# Patient Record
Sex: FEMALE | Race: WHITE | NOT HISPANIC OR LATINO | URBAN - METROPOLITAN AREA
[De-identification: names, ages, dates, MRNs, and addresses within clinical notes are randomized per-mention and may not be internally consistent; named-entity substitution may affect disease eponyms.]

---

## 2019-04-29 ENCOUNTER — INPATIENT (INPATIENT)
Facility: HOSPITAL | Age: 54
LOS: 2 days | Discharge: HOME | DRG: 101 | End: 2019-05-02
Attending: HOSPITALIST | Admitting: HOSPITALIST
Payer: MEDICARE

## 2019-04-29 VITALS
RESPIRATION RATE: 18 BRPM | HEART RATE: 79 BPM | SYSTOLIC BLOOD PRESSURE: 128 MMHG | DIASTOLIC BLOOD PRESSURE: 72 MMHG | WEIGHT: 177.25 LBS | TEMPERATURE: 98 F | OXYGEN SATURATION: 95 %

## 2019-04-29 DIAGNOSIS — R56.9 UNSPECIFIED CONVULSIONS: ICD-10-CM

## 2019-04-29 DIAGNOSIS — Q90.9 DOWN SYNDROME, UNSPECIFIED: ICD-10-CM

## 2019-04-29 LAB
AMPHET UR-MCNC: NEGATIVE — SIGNIFICANT CHANGE UP
ANION GAP SERPL CALC-SCNC: 14 MMOL/L — SIGNIFICANT CHANGE UP (ref 5–17)
BARBITURATES UR SCN-MCNC: NEGATIVE — SIGNIFICANT CHANGE UP
BENZODIAZ UR-MCNC: NEGATIVE — SIGNIFICANT CHANGE UP
BUN SERPL-MCNC: 15 MG/DL — SIGNIFICANT CHANGE UP (ref 8–20)
CALCIUM SERPL-MCNC: 9.2 MG/DL — SIGNIFICANT CHANGE UP (ref 8.6–10.2)
CHLORIDE SERPL-SCNC: 104 MMOL/L — SIGNIFICANT CHANGE UP (ref 98–107)
CO2 SERPL-SCNC: 24 MMOL/L — SIGNIFICANT CHANGE UP (ref 22–29)
COCAINE METAB.OTHER UR-MCNC: NEGATIVE — SIGNIFICANT CHANGE UP
CREAT SERPL-MCNC: 0.95 MG/DL — SIGNIFICANT CHANGE UP (ref 0.5–1.3)
GLUCOSE SERPL-MCNC: 102 MG/DL — SIGNIFICANT CHANGE UP (ref 70–115)
HCT VFR BLD CALC: 41.8 % — SIGNIFICANT CHANGE UP (ref 37–47)
HGB BLD-MCNC: 14.4 G/DL — SIGNIFICANT CHANGE UP (ref 12–16)
MCHC RBC-ENTMCNC: 33.9 PG — HIGH (ref 27–31)
MCHC RBC-ENTMCNC: 34.4 G/DL — SIGNIFICANT CHANGE UP (ref 32–36)
MCV RBC AUTO: 98.4 FL — SIGNIFICANT CHANGE UP (ref 81–99)
METHADONE UR-MCNC: NEGATIVE — SIGNIFICANT CHANGE UP
OPIATES UR-MCNC: NEGATIVE — SIGNIFICANT CHANGE UP
PCP SPEC-MCNC: SIGNIFICANT CHANGE UP
PCP UR-MCNC: NEGATIVE — SIGNIFICANT CHANGE UP
PLATELET # BLD AUTO: 264 K/UL — SIGNIFICANT CHANGE UP (ref 150–400)
POTASSIUM SERPL-MCNC: 3.8 MMOL/L — SIGNIFICANT CHANGE UP (ref 3.5–5.3)
POTASSIUM SERPL-SCNC: 3.8 MMOL/L — SIGNIFICANT CHANGE UP (ref 3.5–5.3)
PROLACTIN SERPL-MCNC: 10.4 NG/ML — SIGNIFICANT CHANGE UP (ref 3.4–24.1)
RBC # BLD: 4.25 M/UL — LOW (ref 4.4–5.2)
RBC # FLD: 14.6 % — SIGNIFICANT CHANGE UP (ref 11–15.6)
SODIUM SERPL-SCNC: 142 MMOL/L — SIGNIFICANT CHANGE UP (ref 135–145)
THC UR QL: NEGATIVE — SIGNIFICANT CHANGE UP
WBC # BLD: 7.2 K/UL — SIGNIFICANT CHANGE UP (ref 4.8–10.8)
WBC # FLD AUTO: 7.2 K/UL — SIGNIFICANT CHANGE UP (ref 4.8–10.8)

## 2019-04-29 PROCEDURE — 99223 1ST HOSP IP/OBS HIGH 75: CPT | Mod: AI

## 2019-04-29 RX ORDER — ALBUTEROL 90 UG/1
1 AEROSOL, METERED ORAL
Qty: 0 | Refills: 0 | COMMUNITY

## 2019-04-29 RX ORDER — ALBUTEROL 90 UG/1
4 AEROSOL, METERED ORAL EVERY 8 HOURS
Qty: 0 | Refills: 0 | Status: DISCONTINUED | OUTPATIENT
Start: 2019-04-29 | End: 2019-05-02

## 2019-04-29 RX ORDER — LEVOTHYROXINE SODIUM 125 MCG
125 TABLET ORAL DAILY
Qty: 0 | Refills: 0 | Status: DISCONTINUED | OUTPATIENT
Start: 2019-04-29 | End: 2019-05-02

## 2019-04-29 RX ORDER — LEVOTHYROXINE SODIUM 125 MCG
1 TABLET ORAL
Qty: 0 | Refills: 0 | COMMUNITY

## 2019-04-29 RX ORDER — ENOXAPARIN SODIUM 100 MG/ML
40 INJECTION SUBCUTANEOUS EVERY 24 HOURS
Qty: 0 | Refills: 0 | Status: DISCONTINUED | OUTPATIENT
Start: 2019-04-29 | End: 2019-05-02

## 2019-04-29 RX ORDER — LORATADINE 10 MG/1
10 TABLET ORAL DAILY
Qty: 0 | Refills: 0 | Status: DISCONTINUED | OUTPATIENT
Start: 2019-04-29 | End: 2019-05-02

## 2019-04-29 RX ORDER — EPINASTINE HYDROCHLORIDE 0.5 MG/ML
1 SOLUTION OPHTHALMIC
Qty: 0 | Refills: 0 | COMMUNITY

## 2019-04-29 RX ORDER — LEVETIRACETAM 250 MG/1
1250 TABLET, FILM COATED ORAL
Qty: 0 | Refills: 0 | Status: DISCONTINUED | OUTPATIENT
Start: 2019-04-29 | End: 2019-05-02

## 2019-04-29 RX ORDER — OMEPRAZOLE 10 MG/1
1 CAPSULE, DELAYED RELEASE ORAL
Qty: 0 | Refills: 0 | COMMUNITY

## 2019-04-29 RX ORDER — CETIRIZINE HYDROCHLORIDE 10 MG/1
1 TABLET ORAL
Qty: 0 | Refills: 0 | COMMUNITY

## 2019-04-29 RX ORDER — LEVETIRACETAM 250 MG/1
2.5 TABLET, FILM COATED ORAL
Qty: 0 | Refills: 0 | COMMUNITY

## 2019-04-29 RX ORDER — PANTOPRAZOLE SODIUM 20 MG/1
40 TABLET, DELAYED RELEASE ORAL
Qty: 0 | Refills: 0 | Status: DISCONTINUED | OUTPATIENT
Start: 2019-04-29 | End: 2019-05-02

## 2019-04-29 RX ADMIN — ALBUTEROL 4 MILLIGRAM(S): 90 AEROSOL, METERED ORAL at 12:59

## 2019-04-29 RX ADMIN — ALBUTEROL 4 MILLIGRAM(S): 90 AEROSOL, METERED ORAL at 21:51

## 2019-04-29 RX ADMIN — LEVETIRACETAM 1250 MILLIGRAM(S): 250 TABLET, FILM COATED ORAL at 17:02

## 2019-04-29 RX ADMIN — LORATADINE 10 MILLIGRAM(S): 10 TABLET ORAL at 12:59

## 2019-04-29 RX ADMIN — ENOXAPARIN SODIUM 40 MILLIGRAM(S): 100 INJECTION SUBCUTANEOUS at 16:58

## 2019-04-29 NOTE — H&P ADULT - NSHPPHYSICALEXAM_GEN_ALL_CORE
PHYSICAL EXAM:    GENERAL: NAD, well-groomed, Downs facial features appreciated  HEAD:  Atraumatic, Normocephalic  EYES: strabismus, CN palsy, PERRLA, conjunctiva and sclera clear  ENMT:; Moist mucous membranes, poor dentition, No lesions  NECK: Supple  NERVOUS SYSTEM:  Alert & Oriented X1-2, Good concentration  CHEST/LUNG: Clear to percussion bilaterally; No rales, rhonchi  HEART: Regular rate and rhythm; No murmurs  ABDOMEN: Soft, Nontender, Nondistended; Bowel sounds present  EXTREMITIES:   No clubbing, cyanosis, or edema  SKIN: No rashes or lesions

## 2019-04-29 NOTE — H&P ADULT - HISTORY OF PRESENT ILLNESS
53yr old female with Downs syndrome presents with new onset seizures since jan 2019. She was admitted at [facility] for the same and has been on Keppra. Per sister, since she has been on Keppra 1250mg bid her seizures have been well controlled. She is now admitted for Video EEG monitoring. Patient does not offer any complaints.

## 2019-04-29 NOTE — PROCEDURE NOTE - NSPOSTCAREGUIDE_GEN_A_CORE
Care for catheter as per unit/ICU protocols/Verbal/written post procedure instructions were given to patient/caregiver/Instructed patient/caregiver regarding signs and symptoms of infection

## 2019-04-29 NOTE — CONSULT NOTE ADULT - SUBJECTIVE AND OBJECTIVE BOX
HPI: Pt is  poor historian and therefore information obtained from the chart.  53yFemale RH with Downs syndrome presents with new onset seizures since jan 2019. She was admitted at [facility] for the same and has been on Keppra. Per sister, since she has been on Keppra 1250mg bid her seizures have been well controlled. Pt has been seen by Dr Porras at [facility] and sent here for VEEG to assess for seizure related abnormalities.  No reported head injuries/infections and offers no complaints.      PAST MEDICAL & SURGICAL HISTORY:  Seizures  Hypothyroidism  Asthma  Down's syndrome  No significant past surgical history    MEDICATIONS  (STANDING):  ALBUTerol  Tablet 4 milliGRAM(s) Oral every 8 hours  enoxaparin Injectable 40 milliGRAM(s) SubCutaneous every 24 hours  levothyroxine 125 MICROGram(s) Oral daily  loratadine 10 milliGRAM(s) Oral daily    MEDICATIONS  (PRN):  pantoprazole    Tablet 40 milliGRAM(s) Oral before breakfast PRN heart burn    Allergies    penicillins (Unknown)    Intolerances        FAMILY HISTORY:  Family history of cancer in mother          SOCIAL HISTORY:  Denies toxic habits;     REVIEW OF SYSTEMS:    As noted in the HPI.    VITAL SIGNS:  Vital Signs Last 24 Hrs  T(C): 36.4 (29 Apr 2019 07:40), Max: 36.4 (29 Apr 2019 07:40)  T(F): 97.6 (29 Apr 2019 07:40), Max: 97.6 (29 Apr 2019 07:40)  HR: 79 (29 Apr 2019 07:40) (79 - 79)  BP: 128/72 (29 Apr 2019 07:40) (128/72 - 128/72)  BP(mean): --  RR: 18 (29 Apr 2019 07:40) (18 - 18)  SpO2: 95% (29 Apr 2019 07:40) (95% - 95%)    PHYSICAL EXAMINATION:  General: Well-developed, well nourished, in no acute distress.  Eyes: Conjunctiva and sclera clear. Fundoscopic examination was deferred. Downs facial findings with dysconjugate eyes.  Neck: Supple.  Cardiac: +S1 & S2; Regular.  Chest: CTA b/l.      Neurologic:  - Mental Status:  Alert, awake, oriented to person Speech is dysarthric follows somewhat.  Cranial Nerves II-XII:  2-12 intact except for dysconjugate eyes.  - Motor:  Strength is symmetric/spontaneous throughout.  There is no pronator drift.  Normal muscle bulk and tone throughout.  - Reflexes:  2+ and symmetric throughout.  Plantars downwards b/l.  - Sensory:  Intact and symmetric to light touch, and joint-position sense.  - Coordination:  Pt unable.  - Gait: Deferred.      LABS:        Pending

## 2019-04-29 NOTE — CONSULT NOTE ADULT - ASSESSMENT
Impression:  Static encephalopathy chronic with Downs syndrome and now with new onset seizures.    Plan:    VEEG x 72 hrs.  Keppra 1250mg twice a day on board.  Maintain seizure precautions.  DVT prophylaxis recommended.  Await for blood serology.  Will follow.

## 2019-04-29 NOTE — H&P ADULT - ASSESSMENT
53yr old with Downs Syndrome admitted with new onset seizures for video EEG monitoring.    # Seizures - Video EEG per Neurology  Neurology consult  AEDs per Neuro recs  Seizure precautions    # Downs Syndrome - Has h/o wandering per sister with elopment risk  Would start Enhanced supervision - RN instructed the need to upgrade level of observation if she is not able to be managed with enhanced supervision  Ct supportive care    # Hypothyroidism - ct levothyroxine    # asthma - ct albuterol, zyrtec - home meds    # DVT px - Lovenox

## 2019-04-30 PROCEDURE — 99232 SBSQ HOSP IP/OBS MODERATE 35: CPT

## 2019-04-30 RX ADMIN — ALBUTEROL 4 MILLIGRAM(S): 90 AEROSOL, METERED ORAL at 04:42

## 2019-04-30 RX ADMIN — ENOXAPARIN SODIUM 40 MILLIGRAM(S): 100 INJECTION SUBCUTANEOUS at 17:01

## 2019-04-30 RX ADMIN — PANTOPRAZOLE SODIUM 40 MILLIGRAM(S): 20 TABLET, DELAYED RELEASE ORAL at 11:19

## 2019-04-30 RX ADMIN — ALBUTEROL 4 MILLIGRAM(S): 90 AEROSOL, METERED ORAL at 14:02

## 2019-04-30 RX ADMIN — LEVETIRACETAM 1250 MILLIGRAM(S): 250 TABLET, FILM COATED ORAL at 05:12

## 2019-04-30 RX ADMIN — ALBUTEROL 4 MILLIGRAM(S): 90 AEROSOL, METERED ORAL at 21:02

## 2019-04-30 RX ADMIN — LORATADINE 10 MILLIGRAM(S): 10 TABLET ORAL at 11:19

## 2019-04-30 RX ADMIN — Medication 125 MICROGRAM(S): at 05:12

## 2019-04-30 RX ADMIN — LEVETIRACETAM 1250 MILLIGRAM(S): 250 TABLET, FILM COATED ORAL at 17:00

## 2019-04-30 NOTE — PROGRESS NOTE ADULT - ASSESSMENT
53yr old with Downs Syndrome admitted with new onset seizures for video EEG monitoring.    # Seizures - Video EEG per Neurology  Neurology consult  AEDs per Neuro recs  Seizure precautions    # Downs Syndrome - Has h/o wandering per sister with elopment risk  ct Enhanced supervision   Ct supportive care    # Hypothyroidism - ct levothyroxine    # asthma - ct albuterol, zyrtec - home meds    # DVT px - Lovenox

## 2019-04-30 NOTE — PROGRESS NOTE ADULT - SUBJECTIVE AND OBJECTIVE BOX
INTERVAL HISTORY:  Seen at bedside and no new changes overnight.    penicillins (Unknown)      VITAL SIGNS:  Vital Signs Last 24 Hrs  T(C): 36.5 (30 Apr 2019 08:54), Max: 37.4 (29 Apr 2019 17:10)  T(F): 97.7 (30 Apr 2019 08:54), Max: 99.4 (29 Apr 2019 17:10)  HR: 76 (30 Apr 2019 08:54) (76 - 99)  BP: 92/64 (30 Apr 2019 08:54) (92/64 - 97/55)  BP(mean): --  RR: 19 (30 Apr 2019 08:54) (18 - 19)  SpO2: 97% (29 Apr 2019 23:40) (97% - 98%)    PHYSICAL EXAMINATION:  General: Well-developed, well nourished, in no acute distress.  Eyes: Conjunctiva and sclera clear.  Neurologic:  - Mental Status:  Alert, awake, oriented to person, place, and time; Speech is normal; Downs facial morphology  - Cranial Nerves: II-XI intact except dysconjugate eyes.  - Motor:  Strength is symmetric and spontaneous throughout.  There is no pronator drift.  Normal muscle bulk and tone throughout.  - Reflexes:  2+ throughout  - Sensory:  Intact to light touch, pin prick, vibration, and joint-position sense throughout.  - Coordination:  No dysmetria/dysdiadochokinesis.    MEDS:  MEDICATIONS  (STANDING):  ALBUTerol  Tablet 4 milliGRAM(s) Oral every 8 hours  enoxaparin Injectable 40 milliGRAM(s) SubCutaneous every 24 hours  levETIRAcetam 1250 milliGRAM(s) Oral two times a day  levothyroxine 125 MICROGram(s) Oral daily  loratadine 10 milliGRAM(s) Oral daily    MEDICATIONS  (PRN):  pantoprazole    Tablet 40 milliGRAM(s) Oral before breakfast PRN heart burn      LABS:                          14.4   7.2   )-----------( 264      ( 29 Apr 2019 10:59 )             41.8     04-29    142  |  104  |  15.0  ----------------------------<  102  3.8   |  24.0  |  0.95    Ca    9.2      29 Apr 2019 10:59        Prolactin, Serum (04.29.19 @ 16:25)    Prolactin, Serum: 10.4 ng/mL    UDS - negative.

## 2019-04-30 NOTE — PROGRESS NOTE ADULT - SUBJECTIVE AND OBJECTIVE BOX
TIFFANIE ESCOBAR    [id number]    53y      Female    CC: New onset seizures, here for Video EEG    INTERVAL HPI/OVERNIGHT EVENTS: no acute events    REVIEW OF SYSTEMS:    Limited ROS : 2/2 mental state - denies any pain, difficulty breathing      Vital Signs Last 24 Hrs  T(C): 36.5 (30 Apr 2019 08:54), Max: 37.4 (29 Apr 2019 17:10)  T(F): 97.7 (30 Apr 2019 08:54), Max: 99.4 (29 Apr 2019 17:10)  HR: 76 (30 Apr 2019 08:54) (76 - 99)  BP: 92/64 (30 Apr 2019 08:54) (92/64 - 97/55)  BP(mean): --  RR: 19 (30 Apr 2019 08:54) (18 - 19)  SpO2: 97% (29 Apr 2019 23:40) (97% - 98%)    PHYSICAL EXAM:    GENERAL: NAD, well-groomed, down's facies  HEENT: PERRL  NECK: soft, Supple  CHEST/LUNG: Clear to percussion bilaterally; No wheezing  HEART: S1S2+, Regular rate and rhythm; No murmurs  EXTREMITIES:  No clubbing, cyanosis, or edema  SKIN: No rashes or lesions  NEURO: AAOX3      LABS:                        14.4   7.2   )-----------( 264      ( 29 Apr 2019 10:59 )             41.8     04-29    142  |  104  |  15.0  ----------------------------<  102  3.8   |  24.0  |  0.95    Ca    9.2      29 Apr 2019 10:59        MEDICATIONS  (STANDING):  ALBUTerol  Tablet 4 milliGRAM(s) Oral every 8 hours  enoxaparin Injectable 40 milliGRAM(s) SubCutaneous every 24 hours  levETIRAcetam 1250 milliGRAM(s) Oral two times a day  levothyroxine 125 MICROGram(s) Oral daily  loratadine 10 milliGRAM(s) Oral daily    MEDICATIONS  (PRN):  pantoprazole    Tablet 40 milliGRAM(s) Oral before breakfast PRN heart burn      RADIOLOGY & ADDITIONAL TESTS:

## 2019-04-30 NOTE — PROGRESS NOTE ADULT - ASSESSMENT
Impression:  Static encephalopathy chronic with Downs syndrome and now with new onset seizures.  Assess for subclinical seizures    Plan:    VEEG x 48 hrs.  Keppra 1250mg twice a day on board.  Maintain seizure precautions.  DVT prophylaxis recommended.  Will follow.

## 2019-05-01 PROCEDURE — 99232 SBSQ HOSP IP/OBS MODERATE 35: CPT

## 2019-05-01 RX ADMIN — ENOXAPARIN SODIUM 40 MILLIGRAM(S): 100 INJECTION SUBCUTANEOUS at 16:58

## 2019-05-01 RX ADMIN — LEVETIRACETAM 1250 MILLIGRAM(S): 250 TABLET, FILM COATED ORAL at 05:55

## 2019-05-01 RX ADMIN — Medication 125 MICROGRAM(S): at 05:55

## 2019-05-01 RX ADMIN — LORATADINE 10 MILLIGRAM(S): 10 TABLET ORAL at 14:12

## 2019-05-01 RX ADMIN — ALBUTEROL 4 MILLIGRAM(S): 90 AEROSOL, METERED ORAL at 05:55

## 2019-05-01 RX ADMIN — ALBUTEROL 4 MILLIGRAM(S): 90 AEROSOL, METERED ORAL at 20:26

## 2019-05-01 RX ADMIN — ALBUTEROL 4 MILLIGRAM(S): 90 AEROSOL, METERED ORAL at 14:12

## 2019-05-01 RX ADMIN — LEVETIRACETAM 1250 MILLIGRAM(S): 250 TABLET, FILM COATED ORAL at 16:59

## 2019-05-01 NOTE — PROGRESS NOTE ADULT - SUBJECTIVE AND OBJECTIVE BOX
TIFFANIE ESCOBAR    [id number]    53y      Female    CC: New onset seizures, here for Video EEG    INTERVAL HPI/OVERNIGHT EVENTS: no acute events    REVIEW OF SYSTEMS:    Limited ROS : 2/2 mental state - denies any pain, difficulty breathing, cough      Vital Signs Last 24 Hrs  T(C): 37 (01 May 2019 07:43), Max: 37.5 (01 May 2019 01:15)  T(F): 98.6 (01 May 2019 07:43), Max: 99.5 (01 May 2019 01:15)  HR: 80 (01 May 2019 07:43) (80 - 82)  BP: 80/55 (01 May 2019 07:43) (80/55 - 91/55)  BP(mean): --  RR: 18 (01 May 2019 01:15) (18 - 18)  SpO2: 97% (01 May 2019 01:15) (97% - 97%)    PHYSICAL EXAM:    GENERAL: NAD, well-groomed, down's facies  HEENT: PERRL  NECK: soft, Supple  CHEST/LUNG: Clear to percussion bilaterally; No wheezing  HEART: S1S2+, Regular rate and rhythm; No murmurs  EXTREMITIES:  No clubbing, cyanosis, or edema  SKIN: No rashes or lesions  NEURO: AAOX3            MEDICATIONS  (STANDING):  ALBUTerol  Tablet 4 milliGRAM(s) Oral every 8 hours  enoxaparin Injectable 40 milliGRAM(s) SubCutaneous every 24 hours  levETIRAcetam 1250 milliGRAM(s) Oral two times a day  levothyroxine 125 MICROGram(s) Oral daily  loratadine 10 milliGRAM(s) Oral daily    MEDICATIONS  (PRN):  pantoprazole    Tablet 40 milliGRAM(s) Oral before breakfast PRN heart burn      RADIOLOGY & ADDITIONAL TESTS:

## 2019-05-01 NOTE — PROGRESS NOTE ADULT - SUBJECTIVE AND OBJECTIVE BOX
INTERVAL HISTORY:  Seen at bedside and no events overnight.    penicillins (Unknown)      VITAL SIGNS:  Vital Signs Last 24 Hrs  T(C): 37 (01 May 2019 07:43), Max: 37.5 (01 May 2019 01:15)  T(F): 98.6 (01 May 2019 07:43), Max: 99.5 (01 May 2019 01:15)  HR: 80 (01 May 2019 07:43) (80 - 82)  BP: 80/55 (01 May 2019 07:43) (80/55 - 91/55)  BP(mean): --  RR: 18 (01 May 2019 01:15) (18 - 18)  SpO2: 97% (01 May 2019 01:15) (97% - 97%)    PHYSICAL EXAMINATION:  General: Well-developed, well nourished, in no acute distress.  Eyes: Conjunctiva and sclera clear.  Neurologic:  - Mental Status:  Asleep but arousable, oriented to person, place, and time; Speech is normal; Downs facial morphology  - Cranial Nerves: II-XI intact except dysconjugate eyes.  - Motor:  Strength is symmetric and spontaneous throughout.  There is no pronator drift.  Normal muscle bulk and tone throughout.  - Reflexes:  2+ throughout  - Sensory:  Intact to light touch, pin prick, vibration, and joint-position sense throughout.  - Coordination:  No dysmetria/dysdiadochokinesis.    MEDS:  MEDICATIONS  (STANDING):  ALBUTerol  Tablet 4 milliGRAM(s) Oral every 8 hours  enoxaparin Injectable 40 milliGRAM(s) SubCutaneous every 24 hours  levETIRAcetam 1250 milliGRAM(s) Oral two times a day  levothyroxine 125 MICROGram(s) Oral daily  loratadine 10 milliGRAM(s) Oral daily    MEDICATIONS  (PRN):  pantoprazole    Tablet 40 milliGRAM(s) Oral before breakfast PRN heart burn      LABS:                          14.4   7.2   )-----------( 264      ( 29 Apr 2019 10:59 )             41.8     04-29    142  |  104  |  15.0  ----------------------------<  102  3.8   |  24.0  |  0.95    Ca    9.2      29 Apr 2019 10:59        Prolactin, Serum (04.29.19 @ 16:25)    Prolactin, Serum: 10.4 ng/mL    UDS - negative.

## 2019-05-01 NOTE — PROGRESS NOTE ADULT - ASSESSMENT
53yr old with Downs Syndrome admitted with new onset seizures for video EEG monitoring.    # Seizures - Video EEG per Neurology  ct AEDs per Neuro recs  Seizure precautions    # Downs Syndrome - Has h/o wandering per sister with elopment risk  ct Enhanced supervision   Ct supportive care    # Hypothyroidism - ct levothyroxine    # asthma - ct albuterol, zyrtec - home meds    # DVT px - Lovenox    plan to Discharge in am

## 2019-05-01 NOTE — PROGRESS NOTE ADULT - ASSESSMENT
Impression:  Static encephalopathy chronic with Downs syndrome and now with new onset seizures.  Assess for subclinical seizures    Plan:    VEEG x 24 hrs.  Keppra 1250mg twice a day on board.  Maintain seizure precautions.  DVT prophylaxis recommended.  Will follow.

## 2019-05-02 VITALS
DIASTOLIC BLOOD PRESSURE: 56 MMHG | SYSTOLIC BLOOD PRESSURE: 88 MMHG | RESPIRATION RATE: 18 BRPM | TEMPERATURE: 98 F | HEART RATE: 80 BPM | OXYGEN SATURATION: 95 %

## 2019-05-02 DIAGNOSIS — J45.909 UNSPECIFIED ASTHMA, UNCOMPLICATED: ICD-10-CM

## 2019-05-02 PROCEDURE — 95951: CPT

## 2019-05-02 PROCEDURE — 85027 COMPLETE CBC AUTOMATED: CPT

## 2019-05-02 PROCEDURE — 80307 DRUG TEST PRSMV CHEM ANLYZR: CPT

## 2019-05-02 PROCEDURE — 80048 BASIC METABOLIC PNL TOTAL CA: CPT

## 2019-05-02 PROCEDURE — 84146 ASSAY OF PROLACTIN: CPT

## 2019-05-02 PROCEDURE — 99239 HOSP IP/OBS DSCHRG MGMT >30: CPT

## 2019-05-02 RX ORDER — HYDROCORTISONE 1 %
1 OINTMENT (GRAM) TOPICAL DAILY
Qty: 0 | Refills: 0 | Status: DISCONTINUED | OUTPATIENT
Start: 2019-05-02 | End: 2019-05-02

## 2019-05-02 RX ORDER — HYDROCORTISONE 1 %
1 OINTMENT (GRAM) TOPICAL
Qty: 10 | Refills: 0 | OUTPATIENT
Start: 2019-05-02 | End: 2019-05-06

## 2019-05-02 RX ADMIN — Medication 125 MICROGRAM(S): at 04:37

## 2019-05-02 RX ADMIN — ALBUTEROL 4 MILLIGRAM(S): 90 AEROSOL, METERED ORAL at 04:37

## 2019-05-02 RX ADMIN — LEVETIRACETAM 1250 MILLIGRAM(S): 250 TABLET, FILM COATED ORAL at 04:37

## 2019-05-02 NOTE — DISCHARGE NOTE NURSING/CASE MANAGEMENT/SOCIAL WORK - NSDCDPATPORTLINK_GEN_ALL_CORE
You can access the [facility] Patient Portal, offered by [facility], by registering with the following website: [email or web address]

## 2019-05-02 NOTE — PROGRESS NOTE ADULT - ASSESSMENT
Impression:  Static encephalopathy chronic with Downs syndrome and now with new onset seizures.      Plan:    Neuro stable.  Continue Keppra 1250mg twice a day.  Maintain seizure precautions.  DVT prophylaxis recommended.  Outpatient follow up at [facility] at [phone] or [phone].   Reconsult PRN.

## 2019-05-02 NOTE — DISCHARGE NOTE PROVIDER - CARE PROVIDER_API CALL
Paul Myers)  Neurology  [address] [place] [address]  Phone: [phone]  Fax: [phone]  Follow Up Time:

## 2019-05-02 NOTE — DISCHARGE NOTE PROVIDER - HOSPITAL COURSE
53yr old female with Downs syndrome presents with new onset seizures since jan 2019. She was admitted at [facility] for the same and has been on Keppra. Per sister, since she has been on Keppra 1250mg bid her seizures have been well controlled. She is now admitted for Video EEG monitoring. She had 72hr video EEG monitoring wile on keppra which     showed         Neurology cleared her for discharge and advised to f/u with them . 53yr old female with Downs syndrome presents with new onset seizures since jan 2019. She was admitted at [facility] for the same and has been on Keppra. Per sister, since she has been on Keppra 1250mg bid her seizures have been well controlled. She is now admitted for Video EEG monitoring. She had 72hr video EEG monitoring wile on keppra which  showed no epileptiform activity , generalized slowing.     Neurology cleared her for discharge and advised to f/u with them .             Vital Signs Last 24 Hrs    T(C): 36.8 (02 May 2019 08:09), Max: 36.9 (01 May 2019 23:43)    T(F): 98.2 (02 May 2019 08:09), Max: 98.5 (01 May 2019 23:43)    HR: 80 (02 May 2019 08:09) (80 - 99)    BP: 88/56 (02 May 2019 08:09) (83/56 - 93/56)    BP(mean): --    RR: 18 (02 May 2019 08:09) (18 - 18)    SpO2: 95% (02 May 2019 08:09) (93% - 96%)        PHYSICAL EXAM:        GENERAL: NAD, well-groomed, down's facies    HEENT: PERRL    NECK: soft, Supple    CHEST/LUNG: Clear to percussion bilaterally; No wheezing    HEART: S1S2+, Regular rate and rhythm; No murmurs    EXTREMITIES:  No clubbing, cyanosis, or edema    SKIN: No rashes or lesions    NEURO: AAOX3            Time spent in discharge planning and co-ordination : 45min

## 2019-05-02 NOTE — PROGRESS NOTE ADULT - SUBJECTIVE AND OBJECTIVE BOX
INTERVAL HISTORY:  Seen at bedside in chair.  No new changes.  No events overnight.    penicillins (Unknown)      VITAL SIGNS:  Vital Signs Last 24 Hrs  T(C): 36.8 (02 May 2019 08:09), Max: 36.9 (01 May 2019 23:43)  T(F): 98.2 (02 May 2019 08:09), Max: 98.5 (01 May 2019 23:43)  HR: 80 (02 May 2019 08:09) (80 - 99)  BP: 88/56 (02 May 2019 08:09) (83/56 - 93/56)  BP(mean): --  RR: 18 (02 May 2019 08:09) (18 - 18)  SpO2: 95% (02 May 2019 08:09) (93% - 96%)    PHYSICAL EXAMINATION:  General: Well-developed, well nourished, in no acute distress.  Eyes: Conjunctiva and sclera clear.  Neurologic:  - Mental Status:  Asleep but arousable, oriented to person, place, and time; Speech is normal; Downs facial morphology  - Cranial Nerves: II-XI intact except dysconjugate eyes.  - Motor:  Strength is symmetric and spontaneous throughout.  There is no pronator drift.  Normal muscle bulk and tone throughout.  - Reflexes:  2+ throughout  - Sensory:  Intact to light touch, pin prick, vibration, and joint-position sense throughout.  - Coordination:  No dysmetria/dysdiadochokinesis.    MEDS:  MEDICATIONS  (STANDING):  ALBUTerol  Tablet 4 milliGRAM(s) Oral every 8 hours  enoxaparin Injectable 40 milliGRAM(s) SubCutaneous every 24 hours  hydrocortisone 1% Ointment 1 Application(s) Topical daily  levETIRAcetam 1250 milliGRAM(s) Oral two times a day  levothyroxine 125 MICROGram(s) Oral daily  loratadine 10 milliGRAM(s) Oral daily    MEDICATIONS  (PRN):  pantoprazole    Tablet 40 milliGRAM(s) Oral before breakfast PRN heart burn

## 2019-05-02 NOTE — DISCHARGE NOTE PROVIDER - NSDCCPCAREPLAN_GEN_ALL_CORE_FT
PRINCIPAL DISCHARGE DIAGNOSIS  Diagnosis: Seizures  Assessment and Plan of Treatment: f/u with neurology as advised      SECONDARY DISCHARGE DIAGNOSES  Diagnosis: Down's syndrome  Assessment and Plan of Treatment:

## 2024-01-02 NOTE — H&P ADULT - NSHPATTENDINGPLANDISCUSS_GEN_ALL_CORE
January 2, 2024    To the Parent(s) of  Colt MINDI Read  [address] [place] [address]  [place] [address]    Your team at [facility] cares about your health. We have reviewed your chart and based on our findings; we are making the following recommendations to better manage your health.     You are in particular need of attention regarding the following:     PREVENTATIVE VISIT: Well Child Visit     If you have already completed these items, please contact the clinic via phone or   MyChart so your care team can review and update your records. Thank you for   choosing [facility] for your healthcare needs. For any questions,   concerns, or to schedule an appointment please contact our clinic.    Healthy Regards,      Your [facility] Care Team                     pt, RN, Sister on phone, EEG tech

## 2025-02-13 NOTE — PATIENT PROFILE ADULT - NSPROMEDSDISPOSITION_GEN_A_NUR
Airway    Date/Time: 6/11/2024 4:50 PM    Performed by: Darian Colón M.D.  Authorized by: Darian Colón M.D.    Location:  OR  Urgency:  Elective  Indications for Airway Management:  Anesthesia      Spontaneous Ventilation: absent    Sedation Level:  Deep  Preoxygenated: Yes    Mask Difficulty Assessment:  0 - not attempted  Final Airway Type:  Supraglottic airway  Final Supraglottic Airway:  Standard LMA    SGA Size:  4  Number of Attempts at Approach:  1        
Patient Quality Outreach    Patient is due for the following:   Physical Annual Wellness Visit    Action(s) Taken:   Schedule a Annual Wellness Visit    Type of outreach:    Sent [employer] message.    Questions for provider review:    None           Saundra Melgoza      
bedside/sent to pharmacy for id and relabeling